# Patient Record
Sex: FEMALE | Race: BLACK OR AFRICAN AMERICAN | NOT HISPANIC OR LATINO | Employment: FULL TIME | URBAN - METROPOLITAN AREA
[De-identification: names, ages, dates, MRNs, and addresses within clinical notes are randomized per-mention and may not be internally consistent; named-entity substitution may affect disease eponyms.]

---

## 2020-08-06 PROBLEM — O10.919 CHRONIC HYPERTENSION AFFECTING PREGNANCY: Status: ACTIVE | Noted: 2020-08-06

## 2020-08-06 PROBLEM — O09.299 H/O PRE-ECLAMPSIA IN PRIOR PREGNANCY, CURRENTLY PREGNANT: Status: ACTIVE | Noted: 2018-08-23

## 2020-08-06 PROBLEM — E66.9 OBESITY: Status: ACTIVE | Noted: 2020-08-06

## 2020-08-06 PROBLEM — O02.1 MISSED ABORTION WITH FETAL DEMISE BEFORE 20 COMPLETED WEEKS OF GESTATION: Status: ACTIVE | Noted: 2018-11-01

## 2020-09-17 PROBLEM — O09.92 SUPERVISION OF HIGH RISK PREGNANCY IN SECOND TRIMESTER: Status: ACTIVE | Noted: 2020-09-17

## 2020-09-17 PROBLEM — Z3A.16 16 WEEKS GESTATION OF PREGNANCY: Status: ACTIVE | Noted: 2020-09-17

## 2020-10-01 PROBLEM — O28.0 ABNORMAL QUAD SCREEN: Status: ACTIVE | Noted: 2020-10-01

## 2020-10-01 PROBLEM — Z3A.18 18 WEEKS GESTATION OF PREGNANCY: Status: ACTIVE | Noted: 2020-09-17

## 2020-10-15 PROBLEM — I10 UNCONTROLLED HYPERTENSION: Status: ACTIVE | Noted: 2020-10-15

## 2020-10-15 PROBLEM — Z3A.20 20 WEEKS GESTATION OF PREGNANCY: Status: ACTIVE | Noted: 2020-09-17

## 2020-10-22 PROBLEM — Z3A.21 21 WEEKS GESTATION OF PREGNANCY: Status: ACTIVE | Noted: 2020-09-17

## 2020-11-12 PROBLEM — Z3A.24 24 WEEKS GESTATION OF PREGNANCY: Status: ACTIVE | Noted: 2020-09-17

## 2020-12-09 PROBLEM — O99.012 ANEMIA AFFECTING PREGNANCY IN SECOND TRIMESTER: Status: ACTIVE | Noted: 2020-12-09

## 2021-01-04 PROBLEM — Z3A.24 24 WEEKS GESTATION OF PREGNANCY: Status: RESOLVED | Noted: 2020-09-17 | Resolved: 2021-01-04

## 2021-01-04 PROBLEM — O09.299 H/O PRE-ECLAMPSIA IN PRIOR PREGNANCY, CURRENTLY PREGNANT: Status: RESOLVED | Noted: 2018-08-23 | Resolved: 2021-01-04

## 2021-01-04 PROBLEM — Z30.011 ENCOUNTER FOR INITIAL PRESCRIPTION OF CONTRACEPTIVE PILLS: Status: ACTIVE | Noted: 2021-01-04

## 2021-01-04 PROBLEM — O09.92 SUPERVISION OF HIGH RISK PREGNANCY IN SECOND TRIMESTER: Status: RESOLVED | Noted: 2020-09-17 | Resolved: 2021-01-04

## 2021-01-04 PROBLEM — O28.0 ABNORMAL QUAD SCREEN: Status: RESOLVED | Noted: 2020-10-01 | Resolved: 2021-01-04

## 2021-01-04 PROBLEM — O99.012 ANEMIA AFFECTING PREGNANCY IN SECOND TRIMESTER: Status: RESOLVED | Noted: 2020-12-09 | Resolved: 2021-01-04

## 2021-01-04 PROBLEM — O02.1 MISSED ABORTION WITH FETAL DEMISE BEFORE 20 COMPLETED WEEKS OF GESTATION: Status: RESOLVED | Noted: 2018-11-01 | Resolved: 2021-01-04

## 2021-10-29 PROBLEM — R87.810 CERVICAL HIGH RISK HPV (HUMAN PAPILLOMAVIRUS) TEST POSITIVE: Status: ACTIVE | Noted: 2021-10-29

## 2021-11-01 PROBLEM — Z30.09 UNWANTED FERTILITY: Status: ACTIVE | Noted: 2021-11-01

## 2021-11-01 PROBLEM — Z30.2 STERILIZATION: Status: ACTIVE | Noted: 2021-01-04

## 2021-11-01 PROBLEM — Z98.51 STATUS POST TUBAL LIGATION: Status: ACTIVE | Noted: 2021-11-01

## 2023-01-04 PROBLEM — N93.9 ABNORMAL UTERINE BLEEDING: Status: ACTIVE | Noted: 2023-01-04

## 2023-02-02 ENCOUNTER — SOCIAL WORK (OUTPATIENT)
Dept: ADMINISTRATIVE | Facility: OTHER | Age: 33
End: 2023-02-02

## 2023-02-02 NOTE — PROGRESS NOTES
Sw received a referral to assist with [facility] resources. [facility] had received a consult to see Patient. After reviewing the referral, it was recommended Patient be seen by a [facility] agency. Sw mailed Patient the contact information for [facility]. She was encouraged to contact them to schedule an assessment if she was still in need of services.    Nadiya Lau LCSW    [phone]

## 2023-05-31 PROBLEM — Z98.890 STATUS POST HYSTEROSCOPY: Status: ACTIVE | Noted: 2023-05-31

## 2023-05-31 PROBLEM — Z30.430 ENCOUNTER FOR INSERTION OF PROGESTIN-RELEASING INTRAUTERINE CONTRACEPTIVE DEVICE (IUD): Status: ACTIVE | Noted: 2023-05-31

## 2023-06-16 PROBLEM — Z30.09 UNWANTED FERTILITY: Status: RESOLVED | Noted: 2021-11-01 | Resolved: 2023-06-16

## 2023-06-16 PROBLEM — Z97.5 IUD (INTRAUTERINE DEVICE) IN PLACE: Status: ACTIVE | Noted: 2023-06-16
